# Patient Record
Sex: MALE | Race: WHITE
[De-identification: names, ages, dates, MRNs, and addresses within clinical notes are randomized per-mention and may not be internally consistent; named-entity substitution may affect disease eponyms.]

---

## 2017-02-15 ENCOUNTER — HOSPITAL ENCOUNTER (OUTPATIENT)
Dept: HOSPITAL 17 - HSDC | Age: 72
Setting detail: OBSERVATION
LOS: 1 days | Discharge: HOME | End: 2017-02-16
Attending: OTOLARYNGOLOGY | Admitting: OTOLARYNGOLOGY
Payer: MEDICARE

## 2017-02-15 VITALS
SYSTOLIC BLOOD PRESSURE: 150 MMHG | RESPIRATION RATE: 18 BRPM | OXYGEN SATURATION: 95 % | DIASTOLIC BLOOD PRESSURE: 70 MMHG | HEART RATE: 66 BPM | TEMPERATURE: 97.6 F

## 2017-02-15 VITALS
RESPIRATION RATE: 16 BRPM | TEMPERATURE: 97.9 F | SYSTOLIC BLOOD PRESSURE: 159 MMHG | DIASTOLIC BLOOD PRESSURE: 80 MMHG | HEART RATE: 68 BPM | OXYGEN SATURATION: 98 %

## 2017-02-15 VITALS
HEART RATE: 65 BPM | RESPIRATION RATE: 20 BRPM | TEMPERATURE: 96.8 F | DIASTOLIC BLOOD PRESSURE: 77 MMHG | OXYGEN SATURATION: 98 % | SYSTOLIC BLOOD PRESSURE: 169 MMHG

## 2017-02-15 VITALS — BODY MASS INDEX: 29.09 KG/M2 | HEIGHT: 64 IN | WEIGHT: 170.42 LBS

## 2017-02-15 VITALS — OXYGEN SATURATION: 94 %

## 2017-02-15 DIAGNOSIS — Z98.61: ICD-10-CM

## 2017-02-15 DIAGNOSIS — I10: ICD-10-CM

## 2017-02-15 DIAGNOSIS — J34.3: ICD-10-CM

## 2017-02-15 DIAGNOSIS — I25.10: ICD-10-CM

## 2017-02-15 DIAGNOSIS — J34.2: Primary | ICD-10-CM

## 2017-02-15 DIAGNOSIS — E11.9: ICD-10-CM

## 2017-02-15 PROCEDURE — 00160 ANES PX NOSE&SINUS NOS: CPT

## 2017-02-15 PROCEDURE — 30140 RESECT INFERIOR TURBINATE: CPT

## 2017-02-15 PROCEDURE — 82948 REAGENT STRIP/BLOOD GLUCOSE: CPT

## 2017-02-15 PROCEDURE — 94762 N-INVAS EAR/PLS OXIMTRY CONT: CPT

## 2017-02-15 PROCEDURE — 21336 OPEN TX SEPTAL FX W/WO STABJ: CPT

## 2017-02-15 PROCEDURE — G0378 HOSPITAL OBSERVATION PER HR: HCPCS

## 2017-02-15 RX ADMIN — SODIUM CHLORIDE SCH MLS/HR: 900 INJECTION INTRAVENOUS at 22:22

## 2017-02-15 RX ADMIN — OXYTOCIN SCH MLS/HR: 10 INJECTION, SOLUTION INTRAMUSCULAR; INTRAVENOUS at 11:30

## 2017-02-15 RX ADMIN — LEVOTHYROXINE SODIUM SCH MCG: 125 TABLET ORAL at 13:14

## 2017-02-15 RX ADMIN — SODIUM CHLORIDE SCH MLS/HR: 900 INJECTION INTRAVENOUS at 15:33

## 2017-02-16 VITALS
DIASTOLIC BLOOD PRESSURE: 65 MMHG | HEART RATE: 65 BPM | RESPIRATION RATE: 18 BRPM | OXYGEN SATURATION: 96 % | TEMPERATURE: 96.3 F | SYSTOLIC BLOOD PRESSURE: 131 MMHG

## 2017-02-16 VITALS
TEMPERATURE: 97.8 F | HEART RATE: 73 BPM | SYSTOLIC BLOOD PRESSURE: 137 MMHG | RESPIRATION RATE: 20 BRPM | DIASTOLIC BLOOD PRESSURE: 66 MMHG | OXYGEN SATURATION: 96 %

## 2017-02-16 VITALS
TEMPERATURE: 97.1 F | RESPIRATION RATE: 18 BRPM | SYSTOLIC BLOOD PRESSURE: 125 MMHG | OXYGEN SATURATION: 96 % | HEART RATE: 63 BPM | DIASTOLIC BLOOD PRESSURE: 61 MMHG

## 2017-02-16 RX ADMIN — SODIUM CHLORIDE SCH MLS/HR: 900 INJECTION INTRAVENOUS at 04:18

## 2017-02-16 RX ADMIN — OXYTOCIN SCH MLS/HR: 10 INJECTION, SOLUTION INTRAMUSCULAR; INTRAVENOUS at 00:27

## 2017-02-16 RX ADMIN — LEVOTHYROXINE SODIUM SCH MCG: 125 TABLET ORAL at 05:26

## 2017-03-06 NOTE — MP
cc:

LANDRY MAURICIO M.D.

****

 

 

DATE OF SURGERY:  February 15, 2017

 

SURGEON

Dr. Landry Mauricio.

 

PREOPERATIVE DIAGNOSIS

 

1. Nasal airway obstruction.

2. Nasal septal deviation.

3. Hypertrophy of inferior turbinates.

 

POSTOPERATIVE DIAGNOSIS

 

1.   Nasal airway obstruction.

2.   Nasal septal deviation.

3.   Hypertrophy of inferior turbinates.

 

OPERATION PERFORMED

 

1.   Open repair nasal septal fracture.

2.   Bilateral submucosal resection of inferior turbinates.

 

INDICATIONS

Indications are documented in the history and physical.

 

DESCRIPTION OF OPERATION

The patient was taken to OR #2 and placed in the supine position.

Following induction of general anesthesia and intubation the nose was packed

bilaterally with cotton pledgets saturated in 0.05% Oxymetazoline. The nasal

septal mucosa and the inferior turbinates were then injected with a total of 8

mL of 1%  Xylocaine with epinephrine 1:100,000. He was then prepped and draped

for surgery.  Packing was removed and a aretha-transfixion incision was made in

the left nasal vestibule and through this incision the mucosa of septum was

elevated bilaterally as far as the junction of the bony and cartilaginous

septum.  This exposed the quadrangular cartilage which showed severe deviation

toward the right with evidence of a long healed nasal septal fracture.

Numerous lines of angulation at the junctions of the fracture segments, a

cumulative area of 2 x 2.5 cm of quadrangular cartilage was removed, preserving

1.5 cm dorsal and caudal cartilaginous struts. The anterior end of the

quadrangular cartilage was mobilized and returned to the midline and sewn in

place to the anterior nasal spine using a single suture of 4-0 chromic.  When

this was completed the mucosa was elevated from the bony septum and the bony

septum was removed using Guzman Brown forceps.  The maxillary crest was

removed using a 6-mm Ryanne chisel preserving the anterior nasal spine.  The

incision was then closed with a running suture of 4-0 chromic and mucosal

layers of septum were approximated to each other with a quilting stitch of 4-0

plain gut. The inferior turbinates were then fractured out medially and stab

incisions were opened along their inferior surfaces.  The submucosal soft

tissue was reduced using a curette and preserving the conchal bone. Incisions

were then cauterized using the suction Bovie at 35 butler.  The remnants of the

inferior turbinates were then re-lateralized to the lateral nasal wall.  The

nose was packed with Merocel tampons coated with bacitracin ointment. The

procedure was terminated.  The patient was then reversed from anesthesia and

taken to recovery in good condition.  There were no complications.  Blood loss

was 40 mL.

 

                              _________________________________

                              MD JORY Garvin/LEIGHA

D:  3/6/2017/5:59 AM

T:  3/6/2017/8:24 AM

Visit #:  [id number]

Job #:  [id number]

## 2018-04-18 ENCOUNTER — HOSPITAL ENCOUNTER (EMERGENCY)
Dept: HOSPITAL 17 - NED | Age: 73
Discharge: LEFT BEFORE DISCHARGE | End: 2018-04-18
Payer: MEDICARE

## 2018-04-18 VITALS
DIASTOLIC BLOOD PRESSURE: 77 MMHG | SYSTOLIC BLOOD PRESSURE: 173 MMHG | TEMPERATURE: 97.5 F | OXYGEN SATURATION: 100 % | RESPIRATION RATE: 18 BRPM | HEART RATE: 67 BPM

## 2018-04-18 VITALS — BODY MASS INDEX: 26.35 KG/M2 | WEIGHT: 154.32 LBS | HEIGHT: 64 IN

## 2018-04-18 DIAGNOSIS — R10.9: Primary | ICD-10-CM

## 2018-04-18 PROCEDURE — 99281 EMR DPT VST MAYX REQ PHY/QHP: CPT
